# Patient Record
Sex: FEMALE | Race: WHITE | NOT HISPANIC OR LATINO | Employment: FULL TIME | URBAN - NONMETROPOLITAN AREA
[De-identification: names, ages, dates, MRNs, and addresses within clinical notes are randomized per-mention and may not be internally consistent; named-entity substitution may affect disease eponyms.]

---

## 2022-03-21 ENCOUNTER — OFFICE VISIT (OUTPATIENT)
Dept: URGENT CARE | Facility: PHYSICIAN GROUP | Age: 22
End: 2022-03-21
Payer: COMMERCIAL

## 2022-03-21 VITALS
DIASTOLIC BLOOD PRESSURE: 76 MMHG | TEMPERATURE: 97.4 F | OXYGEN SATURATION: 100 % | HEART RATE: 82 BPM | BODY MASS INDEX: 18.9 KG/M2 | HEIGHT: 71 IN | SYSTOLIC BLOOD PRESSURE: 126 MMHG | RESPIRATION RATE: 16 BRPM | WEIGHT: 135 LBS

## 2022-03-21 DIAGNOSIS — J02.0 PHARYNGITIS DUE TO STREPTOCOCCUS SPECIES: ICD-10-CM

## 2022-03-21 DIAGNOSIS — J03.90 TONSILLITIS: ICD-10-CM

## 2022-03-21 LAB
INT CON NEG: NORMAL
INT CON POS: NORMAL
S PYO AG THROAT QL: POSITIVE

## 2022-03-21 PROCEDURE — 99203 OFFICE O/P NEW LOW 30 MIN: CPT | Performed by: PHYSICIAN ASSISTANT

## 2022-03-21 PROCEDURE — 87880 STREP A ASSAY W/OPTIC: CPT | Performed by: PHYSICIAN ASSISTANT

## 2022-03-21 RX ORDER — CLINDAMYCIN HYDROCHLORIDE 300 MG/1
300 CAPSULE ORAL 3 TIMES DAILY
Qty: 30 CAPSULE | Refills: 0 | Status: SHIPPED | OUTPATIENT
Start: 2022-03-21 | End: 2022-03-31

## 2022-03-21 ASSESSMENT — ENCOUNTER SYMPTOMS
EYE PAIN: 0
DIARRHEA: 0
PALPITATIONS: 0
VOMITING: 0
MYALGIAS: 0
ABDOMINAL PAIN: 0
COUGH: 0
SINUS PAIN: 0
HEADACHES: 1
FEVER: 0
CHILLS: 0
BLURRED VISION: 0
SWOLLEN GLANDS: 1
SHORTNESS OF BREATH: 0
SORE THROAT: 1
DIZZINESS: 0
NAUSEA: 0

## 2022-03-21 NOTE — PROGRESS NOTES
Subjective     Virginia Mueller is a 21 y.o. female who presents with Pharyngitis (Moved from [place] and is having swelling in jaw, neck and throat. Keeps happening each month for the past 4 months, this time is the worse and tender to touch.)    Pharyngitis   This is a new problem. The current episode started yesterday. The problem has been unchanged. The pain is worse on the left side. There has been no fever. The pain is moderate. Associated symptoms include congestion, headaches, a plugged ear sensation and swollen glands. Pertinent negatives include no abdominal pain, coughing, diarrhea, ear pain, shortness of breath or vomiting. She has had no exposure to strep or mono. She has tried nothing (Patient reports that she has been having these recurrent symptoms almost every month for the past 4 months or so.  She has never been evaluated or treated before, however.) for the symptoms.       Review of Systems   Constitutional: Negative for chills, fever and malaise/fatigue.   HENT: Positive for congestion and sore throat. Negative for ear pain and sinus pain.    Eyes: Negative for blurred vision and pain.   Respiratory: Negative for cough and shortness of breath.    Cardiovascular: Negative for chest pain and palpitations.   Gastrointestinal: Negative for abdominal pain, diarrhea, nausea and vomiting.   Musculoskeletal: Negative for myalgias.   Skin: Negative for rash.   Neurological: Positive for headaches. Negative for dizziness.         PMH:  has no past medical history on file.  MEDS:   Current Outpatient Medications:   •  clindamycin (CLEOCIN) 300 MG Cap, Take 1 Capsule by mouth 3 times a day for 10 days., Disp: 30 Capsule, Rfl: 0  ALLERGIES:   Allergies   Allergen Reactions   • Penicillins      Full body rash     SURGHX: History reviewed. No pertinent surgical history.  SOCHX:  reports that she has never smoked. She has never used smokeless tobacco. She reports previous drug use. She reports that she does  "not drink alcohol.  FH: Family history was reviewed, no pertinent findings to report      Objective     /76   Pulse 82   Temp 36.3 °C (97.4 °F) (Temporal)   Resp 16   Ht 1.803 m (5' 11\")   Wt 61.2 kg (135 lb)   SpO2 100%   BMI 18.83 kg/m²      Physical Exam  Constitutional:       Appearance: She is well-developed.   HENT:      Head: Normocephalic and atraumatic.      Right Ear: Tympanic membrane, ear canal and external ear normal.      Left Ear: Tympanic membrane, ear canal and external ear normal.      Nose: Mucosal edema, congestion and rhinorrhea present. Rhinorrhea is clear.      Mouth/Throat:      Lips: Pink.      Mouth: Mucous membranes are moist.      Pharynx: Uvula midline. Posterior oropharyngeal erythema present. No oropharyngeal exudate or uvula swelling.      Tonsils: Tonsillar exudate present. No tonsillar abscesses. 1+ on the right. 2+ on the left.   Eyes:      Conjunctiva/sclera: Conjunctivae normal.      Pupils: Pupils are equal, round, and reactive to light.   Cardiovascular:      Rate and Rhythm: Normal rate and regular rhythm.      Heart sounds: Normal heart sounds. No murmur heard.  Pulmonary:      Effort: Pulmonary effort is normal.      Breath sounds: Normal breath sounds. No wheezing.   Musculoskeletal:      Cervical back: Normal range of motion.   Lymphadenopathy:      Cervical: Cervical adenopathy present.   Skin:     General: Skin is warm and dry.      Capillary Refill: Capillary refill takes less than 2 seconds.   Neurological:      Mental Status: She is alert and oriented to person, place, and time.   Psychiatric:         Behavior: Behavior normal.         Judgment: Judgment normal.           POCT Rapid Strep A - POSITIVE    Assessment & Plan     1. Pharyngitis due to Streptococcus species  - POCT Rapid Strep A  - clindamycin (CLEOCIN) 300 MG Cap; Take 1 Capsule by mouth 3 times a day for 10 days.  Dispense: 30 Capsule; Refill: 0  -Supportive care discussed to include salt " water gargles, throat lozenges, and increased fluid intake  - Rest  - Tylenol or ibuprofen as needed for fever > 100.4 F  Patient was advised that she is contagious until she has been on the antibiotics for a full 24 hours.  Also recommend that she switch her toothbrush out after being on the antibiotics for 2 to 3 days.    2. Tonsillitis  - POCT Rapid Strep A  - clindamycin (CLEOCIN) 300 MG Cap; Take 1 Capsule by mouth 3 times a day for 10 days.  Dispense: 30 Capsule; Refill: 0            Differential Diagnosis, natural history, and supportive care discussed. Return to the Urgent Care or follow up with your PCP if symptoms fail to resolve, or for any new or worsening symptoms. Emergency room precautions discussed. Patient and/or family appears understanding of information.

## 2022-03-23 ENCOUNTER — APPOINTMENT (OUTPATIENT)
Dept: RADIOLOGY | Facility: IMAGING CENTER | Age: 22
End: 2022-03-23
Attending: CHIROPRACTOR
Payer: COMMERCIAL

## 2022-03-23 ENCOUNTER — APPOINTMENT (OUTPATIENT)
Dept: URGENT CARE | Facility: PHYSICIAN GROUP | Age: 22
End: 2022-03-23
Payer: COMMERCIAL

## 2022-03-23 DIAGNOSIS — M54.6 PAIN IN THORACIC SPINE: ICD-10-CM

## 2022-03-23 PROCEDURE — 72072 X-RAY EXAM THORAC SPINE 3VWS: CPT | Mod: TC,FY | Performed by: PHYSICIAN ASSISTANT

## 2022-05-14 ENCOUNTER — TELEPHONE (OUTPATIENT)
Dept: SCHEDULING | Facility: IMAGING CENTER | Age: 22
End: 2022-05-14

## 2022-05-17 ENCOUNTER — OFFICE VISIT (OUTPATIENT)
Dept: MEDICAL GROUP | Facility: PHYSICIAN GROUP | Age: 22
End: 2022-05-17
Payer: COMMERCIAL

## 2022-05-17 ENCOUNTER — HOSPITAL ENCOUNTER (OUTPATIENT)
Dept: LAB | Facility: MEDICAL CENTER | Age: 22
End: 2022-05-17
Attending: NURSE PRACTITIONER
Payer: COMMERCIAL

## 2022-05-17 VITALS
WEIGHT: 132 LBS | RESPIRATION RATE: 16 BRPM | HEART RATE: 82 BPM | HEIGHT: 71 IN | DIASTOLIC BLOOD PRESSURE: 70 MMHG | SYSTOLIC BLOOD PRESSURE: 112 MMHG | TEMPERATURE: 98.5 F | BODY MASS INDEX: 18.48 KG/M2 | OXYGEN SATURATION: 98 %

## 2022-05-17 DIAGNOSIS — Z13.21 ENCOUNTER FOR VITAMIN DEFICIENCY SCREENING: ICD-10-CM

## 2022-05-17 DIAGNOSIS — Z13.29 SCREENING FOR THYROID DISORDER: ICD-10-CM

## 2022-05-17 DIAGNOSIS — R55 PRE-SYNCOPE: ICD-10-CM

## 2022-05-17 DIAGNOSIS — Z13.6 SCREENING FOR CARDIOVASCULAR CONDITION: ICD-10-CM

## 2022-05-17 DIAGNOSIS — K59.00 CONSTIPATION, UNSPECIFIED CONSTIPATION TYPE: ICD-10-CM

## 2022-05-17 LAB
ALBUMIN SERPL BCP-MCNC: 4.8 G/DL (ref 3.2–4.9)
ALBUMIN/GLOB SERPL: 1.9 G/DL
ALP SERPL-CCNC: 68 U/L (ref 30–99)
ALT SERPL-CCNC: 14 U/L (ref 2–50)
ANION GAP SERPL CALC-SCNC: 11 MMOL/L (ref 7–16)
AST SERPL-CCNC: 17 U/L (ref 12–45)
BASOPHILS # BLD AUTO: 0.3 % (ref 0–1.8)
BASOPHILS # BLD: 0.02 K/UL (ref 0–0.12)
BILIRUB SERPL-MCNC: 0.7 MG/DL (ref 0.1–1.5)
BUN SERPL-MCNC: 10 MG/DL (ref 8–22)
CALCIUM SERPL-MCNC: 9.4 MG/DL (ref 8.5–10.5)
CHLORIDE SERPL-SCNC: 103 MMOL/L (ref 96–112)
CO2 SERPL-SCNC: 22 MMOL/L (ref 20–33)
CREAT SERPL-MCNC: 0.76 MG/DL (ref 0.5–1.4)
EOSINOPHIL # BLD AUTO: 0.03 K/UL (ref 0–0.51)
EOSINOPHIL NFR BLD: 0.5 % (ref 0–6.9)
ERYTHROCYTE [DISTWIDTH] IN BLOOD BY AUTOMATED COUNT: 42 FL (ref 35.9–50)
FASTING STATUS PATIENT QL REPORTED: NORMAL
FERRITIN SERPL-MCNC: 16.8 NG/ML (ref 10–291)
GFR SERPLBLD CREATININE-BSD FMLA CKD-EPI: 115 ML/MIN/1.73 M 2
GLOBULIN SER CALC-MCNC: 2.5 G/DL (ref 1.9–3.5)
GLUCOSE SERPL-MCNC: 82 MG/DL (ref 65–99)
HCT VFR BLD AUTO: 43.9 % (ref 37–47)
HGB BLD-MCNC: 15 G/DL (ref 12–16)
IMM GRANULOCYTES # BLD AUTO: 0.02 K/UL (ref 0–0.11)
IMM GRANULOCYTES NFR BLD AUTO: 0.3 % (ref 0–0.9)
IRON SATN MFR SERPL: 32 % (ref 15–55)
IRON SERPL-MCNC: 109 UG/DL (ref 40–170)
LYMPHOCYTES # BLD AUTO: 1.46 K/UL (ref 1–4.8)
LYMPHOCYTES NFR BLD: 25.3 % (ref 22–41)
MCH RBC QN AUTO: 29.7 PG (ref 27–33)
MCHC RBC AUTO-ENTMCNC: 34.2 G/DL (ref 33.6–35)
MCV RBC AUTO: 86.9 FL (ref 81.4–97.8)
MONOCYTES # BLD AUTO: 0.44 K/UL (ref 0–0.85)
MONOCYTES NFR BLD AUTO: 7.6 % (ref 0–13.4)
NEUTROPHILS # BLD AUTO: 3.79 K/UL (ref 2–7.15)
NEUTROPHILS NFR BLD: 66 % (ref 44–72)
NRBC # BLD AUTO: 0 K/UL
NRBC BLD-RTO: 0 /100 WBC
PLATELET # BLD AUTO: 238 K/UL (ref 164–446)
PMV BLD AUTO: 11.3 FL (ref 9–12.9)
POTASSIUM SERPL-SCNC: 3.8 MMOL/L (ref 3.6–5.5)
PROT SERPL-MCNC: 7.3 G/DL (ref 6–8.2)
RBC # BLD AUTO: 5.05 M/UL (ref 4.2–5.4)
SODIUM SERPL-SCNC: 136 MMOL/L (ref 135–145)
TIBC SERPL-MCNC: 336 UG/DL (ref 250–450)
TSH SERPL DL<=0.005 MIU/L-ACNC: 0.9 UIU/ML (ref 0.38–5.33)
UIBC SERPL-MCNC: 227 UG/DL (ref 110–370)
VIT B12 SERPL-MCNC: 549 PG/ML (ref 211–911)
WBC # BLD AUTO: 5.8 K/UL (ref 4.8–10.8)

## 2022-05-17 PROCEDURE — 83550 IRON BINDING TEST: CPT

## 2022-05-17 PROCEDURE — 84443 ASSAY THYROID STIM HORMONE: CPT

## 2022-05-17 PROCEDURE — 80053 COMPREHEN METABOLIC PANEL: CPT

## 2022-05-17 PROCEDURE — 82607 VITAMIN B-12: CPT

## 2022-05-17 PROCEDURE — 82306 VITAMIN D 25 HYDROXY: CPT

## 2022-05-17 PROCEDURE — 36415 COLL VENOUS BLD VENIPUNCTURE: CPT

## 2022-05-17 PROCEDURE — 83540 ASSAY OF IRON: CPT

## 2022-05-17 PROCEDURE — 99214 OFFICE O/P EST MOD 30 MIN: CPT | Performed by: NURSE PRACTITIONER

## 2022-05-17 PROCEDURE — 85025 COMPLETE CBC W/AUTO DIFF WBC: CPT

## 2022-05-17 PROCEDURE — 82728 ASSAY OF FERRITIN: CPT

## 2022-05-17 ASSESSMENT — PATIENT HEALTH QUESTIONNAIRE - PHQ9: CLINICAL INTERPRETATION OF PHQ2 SCORE: 0

## 2022-05-17 NOTE — PROGRESS NOTES
CC: Establish care, symptoms when exerted    HISTORY OF THE PRESENT ILLNESS: Patient is a 21 y.o. female. This pleasant patient is here today to establish care and for evaluation and management of the following health problems.    Patient moved to the area about 6 months ago.  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      Pre-syncope  Patient is 21-year-old female here to establish care with me today.  During sex or hot showers or vigorous exercise ears start ringing, gets tingling sensation all over, nauseous, and starts to blackout.  She will immediately stop what she is doing and lying down.  Takes about 5 minutes to resolve.  Most recent episode had new symptom of lip muscles demetris and she had no control.  Symptoms have been worsening in the last 6 months.  However, has had symptoms for more than a couple years.  Would often occur when she was participating in track.  Also reports that she is always had trouble regulating her body temperature.  Will be too hot or too cold.  History of eating disorder last year, now resolved.  Though does admit to eating only once a day sometimes.  Drinks approximately 36 ounces of water a day.  She wonders if she has anemia.  Reports menses are irregular, and sometimes light sometimes heavy.  Eats meat.  Does report some constipation.  Has never fully passed out.  Has never seen a provider regarding her symptoms.      Allergies: Amoxicillin and Penicillins    No current [facility]-ordered outpatient medications on file.     No current [facility]-ordered facility-administered medications on file.       History reviewed. No pertinent past medical history.    Past Surgical History:   Procedure Laterality Date   • SEPTOPLASTY N/A        Social History     Tobacco Use   • Smoking status: Never Smoker   • Smokeless tobacco: Never Used   Vaping Use   • Vaping Use: Some days   • Substances: Nicotine   Substance Use Topics   • Alcohol use: Yes     Comment: once in awhile   • Drug  "use: Never       Family History   Problem Relation Age of Onset   • Other Mother         ovarian cyst   • Diabetes Father    • Anxiety disorder Brother        ROS:     - Constitutional: Negative for fever, chills, unexpected weight change, and fatigue/generalized weakness.     - HEENT: Negative for headaches, vision changes, hearing changes, ear pain, rhinorrhea, sinus congestion, and sore throat.      - Respiratory: Negative for cough, dyspnea, and wheezing.      - Cardiovascular: Negative for chest pain, palpitations, orthopnea, and bilateral lower extremity edema.     - Gastrointestinal: As in HPI, otherwise negative for heartburn, vomiting, abdominal pain, hematochezia, melena, diarrhea.  Positive constipation.     - Genitourinary: Negative for dysuria, polyuria, hematuria, pyuria, urinary urgency, and urinary incontinence.    - Musculoskeletal: Negative for myalgias, and joint pain.  Positive back pain.    - Skin: Negative for rash, itching, cyanotic skin color change.     - Neurological: As in HPI, otherwise negative for tremors, focal sensory deficit, focal weakness and headaches.     - Endo/Heme/Allergies: Does not bruise/bleed easily.     - Psychiatric/Behavioral: Negative for depression, suicidal/homicidal ideation and memory loss.           Exam: /70 (BP Location: Right arm, Patient Position: Sitting, BP Cuff Size: Adult)   Pulse 82   Temp 36.9 °C (98.5 °F) (Temporal)   Resp 16   Ht 1.803 m (5' 11\")   Wt 59.9 kg (132 lb)   SpO2 98%  Body mass index is 18.41 kg/m².    General: Alert, pleasant, well nourished, well developed female in NAD  HEENT: Normocephalic. Eyes conjunctiva clear lids without ptosis, pupils equal and reactive to light, ears normal shape and contour, canals are clear bilaterally, tympanic membranes are pearly gray with good light reflex, nasal mucosa without erythema and drainage, oropharynx is without erythema, edema or exudates.   Neck: Supple without bruit. Thyroid is not " enlarged.  Pulmonary: Clear to ausculation.  Normal effort. No rales, ronchi, or wheezing.  Cardiovascular: Normal rate and rhythm without murmur. Carotid and radial pulses are intact and equal bilaterally.  No lower extremity edema.  Abdomen: Soft, nontender, nondistended. Normal bowel sounds. Liver and spleen are not palpable  Neurologic: Grossly nonfocal  Lymph: No cervical or supraclavicular lymph nodes are palpable  Skin: Warm and dry.    Musculoskeletal: Normal gait.   Psych: Normal mood and affect. Alert and oriented. Judgment and insight is normal.    Please note that this dictation was created using voice recognition software. I have made every reasonable attempt to correct obvious errors, but I expect that there are errors of grammar and possibly content that I did not discover before finalizing the note.      Assessment/Plan  1. Pre-syncope  Differentials include hypotension, vasovagal response, dehydration, low blood sugar, hypothyroid, anemia, cardiac etiology.  We will get lab work done.  Treatment plan pending results.  If all normal, consider referral to cardiology.  Patient verbalizes understanding.  In the meantime, advised patient to keep a journal of what she is eating and drinking to see if there is a correlation with symptoms.  - Comp Metabolic Panel; Future  - ESTIMATED GFR; Future  - VITAMIN B12; Future  - CBC WITH DIFFERENTIAL; Future  - FERRITIN; Future  - IRON/TOTAL IRON BIND; Future    2. Screening for cardiovascular condition    - Comp Metabolic Panel; Future  - ESTIMATED GFR; Future  - CBC WITH DIFFERENTIAL; Future    3. Encounter for vitamin deficiency screening    - VITAMIN D,25 HYDROXY; Future    4. Screening for thyroid disorder    - TSH WITH REFLEX TO FT4; Future    5. Constipation, unspecified constipation type  In regards to constipation, advised on high-fiber diet and handout given to patient today.

## 2022-05-17 NOTE — ASSESSMENT & PLAN NOTE
Patient is 21-year-old female here to establish care with me today.  During sex or hot showers or vigorous exercise ears start ringing, gets tingling sensation all over, nauseous, and starts to blackout.  She will immediately stop what she is doing and lying down.  Takes about 5 minutes to resolve.  Most recent episode had new symptom of lip muscles demetris and she had no control.  Symptoms have been worsening in the last 6 months.  However, has had symptoms for more than a couple years.  Would often occur when she was participating in track.  Also reports that she is always had trouble regulating her body temperature.  Will be too hot or too cold.  History of eating disorder last year, now resolved.  Though does admit to eating only once a day sometimes.  Drinks approximately 36 ounces of water a day.  She wonders if she has anemia.  Reports menses are irregular, and sometimes light sometimes heavy.  Eats meat.  Does report some constipation.  Has never fully passed out.  Has never seen a provider regarding her symptoms.

## 2022-05-21 LAB — 25(OH)D3 SERPL-MCNC: 26 NG/ML (ref 30–80)

## 2022-05-26 ENCOUNTER — PATIENT MESSAGE (OUTPATIENT)
Dept: MEDICAL GROUP | Facility: PHYSICIAN GROUP | Age: 22
End: 2022-05-26
Payer: COMMERCIAL

## 2022-05-26 DIAGNOSIS — R55 PRE-SYNCOPE: ICD-10-CM

## 2022-06-22 ENCOUNTER — OFFICE VISIT (OUTPATIENT)
Dept: URGENT CARE | Facility: PHYSICIAN GROUP | Age: 22
End: 2022-06-22
Payer: COMMERCIAL

## 2022-06-22 VITALS
HEART RATE: 67 BPM | SYSTOLIC BLOOD PRESSURE: 100 MMHG | HEIGHT: 71 IN | WEIGHT: 136 LBS | RESPIRATION RATE: 14 BRPM | OXYGEN SATURATION: 99 % | BODY MASS INDEX: 19.04 KG/M2 | TEMPERATURE: 98.3 F | DIASTOLIC BLOOD PRESSURE: 60 MMHG

## 2022-06-22 DIAGNOSIS — J06.9 VIRAL UPPER RESPIRATORY TRACT INFECTION: ICD-10-CM

## 2022-06-22 DIAGNOSIS — R51.9 ACUTE NONINTRACTABLE HEADACHE, UNSPECIFIED HEADACHE TYPE: ICD-10-CM

## 2022-06-22 LAB
EXTERNAL QUALITY CONTROL: NORMAL
SARS-COV+SARS-COV-2 AG RESP QL IA.RAPID: NEGATIVE

## 2022-06-22 PROCEDURE — 99213 OFFICE O/P EST LOW 20 MIN: CPT | Performed by: NURSE PRACTITIONER

## 2022-06-22 PROCEDURE — 87426 SARSCOV CORONAVIRUS AG IA: CPT | Performed by: NURSE PRACTITIONER

## 2022-06-22 ASSESSMENT — ENCOUNTER SYMPTOMS
HEADACHES: 1
HEMOPTYSIS: 0
WHEEZING: 0
FEVER: 0
SPUTUM PRODUCTION: 0
SORE THROAT: 1
SHORTNESS OF BREATH: 0
COUGH: 1
SINUS PAIN: 0
DIZZINESS: 0
DIAPHORESIS: 0
CHILLS: 0

## 2022-06-22 ASSESSMENT — FIBROSIS 4 INDEX: FIB4 SCORE: 0.36

## 2022-06-22 NOTE — LETTER
June 22, 2022         Patient: Virginia Mueller   YOB: 2000   Date of Visit: 6/22/2022           To Whom it May Concern:    Virginia Mueller was seen in my clinic on 6/22/2022. She may return to work in 1-3 days when symptoms improve. Please excuse her absence due to acute illness.    If you have any questions or concerns, please don't hesitate to call.        Sincerely,           MAGALIS Ortiz.  Electronically Signed

## 2022-06-22 NOTE — PROGRESS NOTES
"Subjective     Virginia Mueller is a 21 y.o. female who presents with Cough (Started yesterday), Headache, and Muscle Pain (In her neck)            Virginia comes in today with a 2 day history of fatigue, headache, rhinorrhea, mild sore throat, mild cough and myalgia.  Denies any fever, chills, chest pain, shortness of breath or neck stiffness.  Not taking any meds to treat the symptoms.  Not vaccinated against covid.  She thinks she had covid 2 years ago but was never tested.          Review of Systems   Constitutional: Positive for malaise/fatigue. Negative for chills, diaphoresis and fever.   HENT: Positive for congestion and sore throat. Negative for ear pain and sinus pain.    Respiratory: Positive for cough. Negative for hemoptysis, sputum production, shortness of breath and wheezing.    Cardiovascular: Negative for chest pain.   Neurological: Positive for headaches. Negative for dizziness.     Medications, Allergies, and current problem list reviewed today in Epic         Objective     Blood Pressure 100/60   Pulse 67   Temperature 36.8 °C (98.3 °F) (Temporal)   Respiration 14   Height 1.803 m (5' 11\")   Weight 61.7 kg (136 lb)   Oxygen Saturation 99%   Body Mass Index 18.97 kg/m²      Physical Exam  Vitals reviewed.   Constitutional:       General: She is not in acute distress.     Appearance: Normal appearance. She is well-developed. She is not ill-appearing, toxic-appearing or diaphoretic.   HENT:      Head: Normocephalic.      Right Ear: Tympanic membrane, ear canal and external ear normal. There is no impacted cerumen.      Left Ear: Tympanic membrane, ear canal and external ear normal. There is no impacted cerumen.      Nose: Rhinorrhea present. No congestion.      Mouth/Throat:      Mouth: Mucous membranes are moist.      Pharynx: Posterior oropharyngeal erythema present. No oropharyngeal exudate.   Eyes:      General: No scleral icterus.        Right eye: No discharge.         Left eye: No " discharge.      Conjunctiva/sclera: Conjunctivae normal.   Neck:      Thyroid: No thyromegaly.      Vascular: No JVD.      Trachea: No tracheal deviation.   Cardiovascular:      Rate and Rhythm: Normal rate and regular rhythm.      Heart sounds: Normal heart sounds. No murmur heard.    No friction rub. No gallop.   Pulmonary:      Effort: Pulmonary effort is normal. No respiratory distress.      Breath sounds: Normal breath sounds. No stridor. No wheezing, rhonchi or rales.   Chest:      Chest wall: No tenderness.   Musculoskeletal:      Cervical back: Neck supple.   Lymphadenopathy:      Cervical: No cervical adenopathy.   Skin:     General: Skin is warm and dry.      Coloration: Skin is not jaundiced or pale.   Neurological:      Mental Status: She is alert and oriented to person, place, and time.                POCT covid antigen: negative             Assessment & Plan        1. Viral upper respiratory tract infection  - POCT SARS-COV Antigen GENIE (Symptomatic only)    2. Acute nonintractable headache, unspecified headache type     Advised Virginia that based on the history and exam findings, this is likely a viral illness.  There is no indication for antibiotics at this time.  Differential reviewed.  She politely declined covid PCR due to anticipated cost as a barrier.  OTC NSAIDs or tylenol prn fever, pain.  OTC cold medications prn symptom management.  Maintain adequate po hydration.  RTC in 5-7 days if symptoms persist, sooner if worse.  Patient verbalized understanding of and agreed with plan of care.

## 2022-06-29 ENCOUNTER — PATIENT MESSAGE (OUTPATIENT)
Dept: MEDICAL GROUP | Facility: PHYSICIAN GROUP | Age: 22
End: 2022-06-29
Payer: COMMERCIAL

## 2022-07-21 ENCOUNTER — TELEPHONE (OUTPATIENT)
Dept: CARDIOLOGY | Facility: MEDICAL CENTER | Age: 22
End: 2022-07-21
Payer: COMMERCIAL

## 2022-07-21 NOTE — TELEPHONE ENCOUNTER
Spoke to pt in regards to obtaining records for NP appointment with [facility]. Per patient has never been treated by a previous cardiologist. Confirmed that all recent notes and labs are in Epic. Pt stated she has had no cardiac imaging or testing done. Confirmed with patient appointment date, time, and location.

## 2022-08-01 ENCOUNTER — OFFICE VISIT (OUTPATIENT)
Dept: CARDIOLOGY | Facility: PHYSICIAN GROUP | Age: 22
End: 2022-08-01
Attending: NURSE PRACTITIONER
Payer: COMMERCIAL

## 2022-08-01 VITALS
RESPIRATION RATE: 12 BRPM | OXYGEN SATURATION: 98 % | WEIGHT: 136 LBS | BODY MASS INDEX: 19.04 KG/M2 | SYSTOLIC BLOOD PRESSURE: 104 MMHG | HEIGHT: 71 IN | HEART RATE: 60 BPM | DIASTOLIC BLOOD PRESSURE: 60 MMHG

## 2022-08-01 DIAGNOSIS — R55 PRE-SYNCOPE: ICD-10-CM

## 2022-08-01 PROCEDURE — 99203 OFFICE O/P NEW LOW 30 MIN: CPT | Performed by: INTERNAL MEDICINE

## 2022-08-01 ASSESSMENT — ENCOUNTER SYMPTOMS
COUGH: 0
WEIGHT GAIN: 0
SYNCOPE: 0
PALPITATIONS: 0
ABDOMINAL PAIN: 0
DIZZINESS: 0
CONSTIPATION: 0
SHORTNESS OF BREATH: 0
VOMITING: 0
DEPRESSION: 0
CLAUDICATION: 0
BACK PAIN: 0
WEIGHT LOSS: 0
DIARRHEA: 0
NEAR-SYNCOPE: 0
ALTERED MENTAL STATUS: 0
IRREGULAR HEARTBEAT: 0
DYSPNEA ON EXERTION: 0
PND: 0
BLURRED VISION: 0
FEVER: 0
DECREASED APPETITE: 0
HEARTBURN: 0
ORTHOPNEA: 0
FLANK PAIN: 0
NAUSEA: 0

## 2022-08-01 ASSESSMENT — FIBROSIS 4 INDEX: FIB4 SCORE: 0.36

## 2022-08-01 NOTE — PATIENT INSTRUCTIONS
Increase fluid intake and more regular meals.   Exercise especially leg toning.   Quit cessation of vaping.

## 2022-08-01 NOTE — PROGRESS NOTES
"Cardiology Note    Chief Complaint   Patient presents with   • Syncope     NP Dx: Pre-Syncope        History of Present Illness: Virginia Mueller is a 21 y.o. female who presents for initial visit. Referred by Dr Brand for presyncope.    About a year ago presyncope. Mostly during running on hot days. Now also noticed in hot shower. Also noticed during sexual activity. Tingling in arms and face and nausea as well. Felt lip numbness when occurs. No other cardiac complaints. Doesn't appear has ever had syncope but has had to lower herself to floor to keep from losing consciousness. She started vaping at parties but now having a hard time weaning off. Drinks about 1-2 beers per night and occasional binging. Denies illicit drugs. Mother with heart condition NOS; called her and described history of tachycardia. Patient admits doesn't eat regular meals. She suspects has \"eating disorder\" over past year. She is also an avid athlete and used to work out regularly. Works for [employer]. Moved from [place].      Review of Systems   Constitutional: Negative for decreased appetite, fever, malaise/fatigue, weight gain and weight loss.   HENT: Negative for congestion and nosebleeds.    Eyes: Negative for blurred vision.   Cardiovascular: Negative for chest pain, claudication, dyspnea on exertion, irregular heartbeat, leg swelling, near-syncope, orthopnea, palpitations, paroxysmal nocturnal dyspnea and syncope.   Respiratory: Negative for cough and shortness of breath.    Endocrine: Negative for cold intolerance and heat intolerance.   Skin: Negative for rash.   Musculoskeletal: Negative for back pain.   Gastrointestinal: Negative for abdominal pain, constipation, diarrhea, heartburn, melena, nausea and vomiting.   Genitourinary: Negative for dysuria, flank pain and hematuria.   Neurological: Negative for dizziness.   Psychiatric/Behavioral: Negative for altered mental status and depression.         History reviewed. No " "pertinent past medical history.      Past Surgical History:   Procedure Laterality Date   • SEPTOPLASTY N/A          No current outpatient medications on file.     No current facility-administered medications for this visit.         Allergies   Allergen Reactions   • Amoxicillin      Other reaction(s): Hives   • Penicillins      Full body rash         Family History   Problem Relation Age of Onset   • Other Mother         ovarian cyst   • Diabetes Father    • Anxiety disorder Brother          Social History     Socioeconomic History   • Marital status: Single     Spouse name: Not on file   • Number of children: Not on file   • Years of education: Not on file   • Highest education level: Not on file   Occupational History   • Not on file   Tobacco Use   • Smoking status: Never Smoker   • Smokeless tobacco: Never Used   Vaping Use   • Vaping Use: Every day   • Substances: Nicotine   Substance and Sexual Activity   • Alcohol use: Yes     Comment: occ   • Drug use: Never   • Sexual activity: Not Currently     Partners: Male     Birth control/protection: Condom   Other Topics Concern   • Not on file   Social History Narrative   • Not on file     Social Determinants of Health     Financial Resource Strain: Not on file   Food Insecurity: Not on file   Transportation Needs: Not on file   Physical Activity: Not on file   Stress: Not on file   Social Connections: Not on file   Intimate Partner Violence: Not on file   Housing Stability: Not on file         Physical Exam:  Ambulatory Vitals  /60 (BP Location: Right arm, Patient Position: Standing, BP Cuff Size: Adult)   Pulse 60   Resp 12   Ht 1.803 m (5' 11\")   Wt 61.7 kg (136 lb)   SpO2 98%    BP Readings from Last 4 Encounters:   08/01/22 104/60   06/22/22 100/60   05/17/22 112/70   03/21/22 126/76     Weight/BMI:   Vitals:    08/01/22 1350 08/01/22 1424 08/01/22 1426 08/01/22 1427   BP: 102/58 100/58 110/64 104/60   Weight: 61.7 kg (136 lb)      Height: 1.803 m " "(5' 11\")       Body mass index is 18.97 kg/m².  Wt Readings from Last 4 Encounters:   08/01/22 61.7 kg (136 lb) (64 %, Z= 0.36)*   06/22/22 61.7 kg (136 lb) (64 %, Z= 0.37)*   05/17/22 59.9 kg (132 lb) (58 %, Z= 0.21)*   03/21/22 61.2 kg (135 lb) (64 %, Z= 0.36)*     * Growth percentiles are based on CDC (Girls, 2-20 Years) data.       Physical Exam  Constitutional:       General: She is not in acute distress.  HENT:      Head: Normocephalic and atraumatic.   Eyes:      Conjunctiva/sclera: Conjunctivae normal.      Pupils: Pupils are equal, round, and reactive to light.   Neck:      Vascular: No JVD.   Cardiovascular:      Rate and Rhythm: Normal rate and regular rhythm.      Heart sounds: Normal heart sounds. No murmur heard.    No friction rub. No gallop.   Pulmonary:      Effort: Pulmonary effort is normal. No respiratory distress.      Breath sounds: Normal breath sounds. No wheezing or rales.   Chest:      Chest wall: No tenderness.   Abdominal:      General: Bowel sounds are normal. There is no distension.      Palpations: Abdomen is soft.   Musculoskeletal:      Cervical back: Normal range of motion and neck supple.   Skin:     General: Skin is warm and dry.   Neurological:      Mental Status: She is alert and oriented to person, place, and time.   Psychiatric:         Mood and Affect: Affect normal.         Judgment: Judgment normal.         Lab Data Review:  No results found for: CHOLSTRLTOT, LDL, HDL, TRIGLYCERIDE    Lab Results   Component Value Date/Time    SODIUM 136 05/17/2022 12:16 PM    POTASSIUM 3.8 05/17/2022 12:16 PM    CHLORIDE 103 05/17/2022 12:16 PM    CO2 22 05/17/2022 12:16 PM    GLUCOSE 82 05/17/2022 12:16 PM    BUN 10 05/17/2022 12:16 PM    CREATININE 0.76 05/17/2022 12:16 PM     CrCl cannot be calculated (Patient's most recent lab result is older than the maximum 7 days allowed.).  Lab Results   Component Value Date/Time    ALKPHOSPHAT 68 05/17/2022 12:16 PM    ASTSGOT 17 05/17/2022 12:16 " PM    ALTSGPT 14 05/17/2022 12:16 PM    TBILIRUBIN 0.7 05/17/2022 12:16 PM      Lab Results   Component Value Date/Time    WBC 5.8 05/17/2022 12:16 PM     No results found for: HBA1C  No components found for: TROP      Cardiac Imaging and Procedures Review:      EKG 8/1/22 interpreted by me normal sinus  Orthostatics in office negative    Medical Decision Making:  Problem List Items Addressed This Visit     Pre-syncope    Relevant Orders    EKG        Most suggestive of orthostatic or vasovagal symptoms. Please increase fluid intake and consume regular meals. Liberalize salt intake. Resume exercise especially for leg toning/strenthening. Vaping cessation and avoidance alcohol binging.     It was my pleasure to meet with Ms. Mueller.

## 2022-10-25 ENCOUNTER — HOSPITAL ENCOUNTER (OUTPATIENT)
Facility: MEDICAL CENTER | Age: 22
End: 2022-10-25
Attending: STUDENT IN AN ORGANIZED HEALTH CARE EDUCATION/TRAINING PROGRAM
Payer: COMMERCIAL

## 2022-10-25 ENCOUNTER — OFFICE VISIT (OUTPATIENT)
Dept: URGENT CARE | Facility: CLINIC | Age: 22
End: 2022-10-25
Payer: COMMERCIAL

## 2022-10-25 VITALS
WEIGHT: 139 LBS | SYSTOLIC BLOOD PRESSURE: 102 MMHG | TEMPERATURE: 98.5 F | BODY MASS INDEX: 19.46 KG/M2 | RESPIRATION RATE: 14 BRPM | HEART RATE: 95 BPM | OXYGEN SATURATION: 98 % | HEIGHT: 71 IN | DIASTOLIC BLOOD PRESSURE: 64 MMHG

## 2022-10-25 DIAGNOSIS — N30.00 ACUTE CYSTITIS WITHOUT HEMATURIA: ICD-10-CM

## 2022-10-25 LAB
APPEARANCE UR: NORMAL
BILIRUB UR STRIP-MCNC: NORMAL MG/DL
COLOR UR AUTO: NORMAL
GLUCOSE UR STRIP.AUTO-MCNC: 250 MG/DL
INT CON NEG: NEGATIVE
INT CON POS: POSITIVE
KETONES UR STRIP.AUTO-MCNC: 15 MG/DL
LEUKOCYTE ESTERASE UR QL STRIP.AUTO: NORMAL
NITRITE UR QL STRIP.AUTO: POSITIVE
PH UR STRIP.AUTO: 5 [PH] (ref 5–8)
POC URINE PREGNANCY TEST: NEGATIVE
PROT UR QL STRIP: >=300 MG/DL
RBC UR QL AUTO: NORMAL
SP GR UR STRIP.AUTO: 1
UROBILINOGEN UR STRIP-MCNC: 4 MG/DL

## 2022-10-25 PROCEDURE — 99213 OFFICE O/P EST LOW 20 MIN: CPT | Performed by: STUDENT IN AN ORGANIZED HEALTH CARE EDUCATION/TRAINING PROGRAM

## 2022-10-25 PROCEDURE — 81002 URINALYSIS NONAUTO W/O SCOPE: CPT | Performed by: STUDENT IN AN ORGANIZED HEALTH CARE EDUCATION/TRAINING PROGRAM

## 2022-10-25 PROCEDURE — 87077 CULTURE AEROBIC IDENTIFY: CPT

## 2022-10-25 PROCEDURE — 87086 URINE CULTURE/COLONY COUNT: CPT

## 2022-10-25 PROCEDURE — 81025 URINE PREGNANCY TEST: CPT | Performed by: STUDENT IN AN ORGANIZED HEALTH CARE EDUCATION/TRAINING PROGRAM

## 2022-10-25 RX ORDER — NITROFURANTOIN 25; 75 MG/1; MG/1
100 CAPSULE ORAL EVERY 12 HOURS
Qty: 10 CAPSULE | Refills: 0 | Status: SHIPPED | OUTPATIENT
Start: 2022-10-25 | End: 2022-10-30

## 2022-10-25 ASSESSMENT — ENCOUNTER SYMPTOMS
VOMITING: 0
SWEATS: 0
FEVER: 0
ABDOMINAL PAIN: 0
CHILLS: 0
NAUSEA: 0
FLANK PAIN: 0

## 2022-10-25 ASSESSMENT — FIBROSIS 4 INDEX: FIB4 SCORE: 0.36

## 2022-10-26 DIAGNOSIS — N30.00 ACUTE CYSTITIS WITHOUT HEMATURIA: ICD-10-CM

## 2022-10-26 NOTE — PROGRESS NOTES
"Subjective     Virginia Mueller is a 21 y.o. female who presents with UTI (X 2 days pain with urination, urgency and frequency. )            UTI  This is a new problem. The current episode started yesterday. The problem has been unchanged. Associated symptoms include urinary symptoms. Pertinent negatives include no abdominal pain, chills, fever, nausea or vomiting.   Dysuria   This is a new problem. The current episode started yesterday. The problem has been unchanged. The quality of the pain is described as burning. The pain is mild. Associated symptoms include frequency and urgency. Pertinent negatives include no chills, discharge, flank pain, hematuria, hesitancy, nausea, possible pregnancy, sweats or vomiting. Treatments tried: AZO.     Review of Systems   Constitutional:  Negative for chills, fever and malaise/fatigue.   Gastrointestinal:  Negative for abdominal pain, nausea and vomiting.   Genitourinary:  Positive for dysuria, frequency and urgency. Negative for flank pain, hematuria and hesitancy.   All other systems reviewed and are negative.           Objective     /64   Pulse 95   Temp 36.9 °C (98.5 °F) (Temporal)   Resp 14   Ht 1.803 m (5' 11\")   Wt 63 kg (139 lb)   LMP 09/27/2022   SpO2 98%   BMI 19.39 kg/m²      Physical Exam  Vitals reviewed.   Constitutional:       Appearance: Normal appearance.   HENT:      Head: Normocephalic and atraumatic.   Cardiovascular:      Rate and Rhythm: Normal rate.   Pulmonary:      Effort: Pulmonary effort is normal.   Abdominal:      General: Abdomen is flat. Bowel sounds are normal. There is no distension.      Palpations: Abdomen is soft.      Tenderness: There is no abdominal tenderness. There is no right CVA tenderness, left CVA tenderness, guarding or rebound.   Skin:     General: Skin is warm and dry.   Neurological:      General: No focal deficit present.      Mental Status: She is alert. Mental status is at baseline.                     "       Assessment & Plan        1. Acute cystitis without hematuria  - POCT Urinalysis  - POCT Pregnancy  - URINE CULTURE(NEW); Future  - nitrofurantoin (MACROBID) 100 MG Cap; Take 1 Capsule by mouth every 12 hours for 5 days.  Dispense: 10 Capsule; Refill: 0     Patient did take AZO prior to urgent care visit. Patinet to be treated for UTI given patients symptoms. Urine culture collected and sent to lab. Patient notified of any positive results. Results available via [employer]. Supportive care includes: increase fluids and OTC NSAIDs. F/U with PCP.    I personally reviewed prior external notes and test results pertinent to today's visit.    Differential diagnoses, supportive care, and indications for immediate follow-up discussed with patient. Pathogenesis of diagnosis discussed including typical length and natural progression.      Instructed to return to urgent care or nearest emergency department if symptoms fail to improve, for any change in condition, further concerns, or new concerning symptoms.    Patient states understanding and agrees with the plan of care and discharge instructions.

## 2022-10-28 LAB
BACTERIA UR CULT: ABNORMAL
BACTERIA UR CULT: ABNORMAL
SIGNIFICANT IND 70042: ABNORMAL
SITE SITE: ABNORMAL
SOURCE SOURCE: ABNORMAL

## 2022-10-30 ENCOUNTER — TELEPHONE (OUTPATIENT)
Dept: URGENT CARE | Facility: PHYSICIAN GROUP | Age: 22
End: 2022-10-30

## 2022-10-30 DIAGNOSIS — N30.01 ACUTE CYSTITIS WITH HEMATURIA: ICD-10-CM

## 2023-01-12 NOTE — TELEPHONE ENCOUNTER
Urine Culture positive for Streptococcus agalactiae.    Instructed to return to urgent care or nearest emergency department if symptoms fail to improve, for any change in condition, further concerns, or new concerning symptoms.

## 2023-06-28 ENCOUNTER — OFFICE VISIT (OUTPATIENT)
Dept: URGENT CARE | Facility: PHYSICIAN GROUP | Age: 23
End: 2023-06-28
Payer: COMMERCIAL

## 2023-06-28 VITALS
TEMPERATURE: 97.6 F | RESPIRATION RATE: 14 BRPM | SYSTOLIC BLOOD PRESSURE: 112 MMHG | DIASTOLIC BLOOD PRESSURE: 76 MMHG | BODY MASS INDEX: 19.67 KG/M2 | HEART RATE: 114 BPM | WEIGHT: 141 LBS | OXYGEN SATURATION: 97 %

## 2023-06-28 DIAGNOSIS — A60.04 HERPES SIMPLEX VULVOVAGINITIS: ICD-10-CM

## 2023-06-28 PROCEDURE — 3074F SYST BP LT 130 MM HG: CPT | Performed by: STUDENT IN AN ORGANIZED HEALTH CARE EDUCATION/TRAINING PROGRAM

## 2023-06-28 PROCEDURE — 3078F DIAST BP <80 MM HG: CPT | Performed by: STUDENT IN AN ORGANIZED HEALTH CARE EDUCATION/TRAINING PROGRAM

## 2023-06-28 PROCEDURE — 99214 OFFICE O/P EST MOD 30 MIN: CPT | Performed by: STUDENT IN AN ORGANIZED HEALTH CARE EDUCATION/TRAINING PROGRAM

## 2023-06-28 RX ORDER — VALACYCLOVIR HYDROCHLORIDE 500 MG/1
500 TABLET, FILM COATED ORAL 2 TIMES DAILY
Qty: 6 TABLET | Refills: 1 | Status: SHIPPED | OUTPATIENT
Start: 2023-06-28 | End: 2023-07-04

## 2023-06-28 RX ORDER — LIDOCAINE 5 G/100G
1 CREAM RECTAL; TOPICAL 2 TIMES DAILY PRN
Qty: 45 G | Refills: 0 | Status: SHIPPED | OUTPATIENT
Start: 2023-06-28

## 2023-06-28 ASSESSMENT — ENCOUNTER SYMPTOMS
NAUSEA: 0
FLANK PAIN: 0
FEVER: 0
ABDOMINAL PAIN: 0
VOMITING: 0
DIARRHEA: 0
PALPITATIONS: 0
WHEEZING: 0
COUGH: 0
SHORTNESS OF BREATH: 0
CHILLS: 0
CONSTIPATION: 0

## 2023-06-28 ASSESSMENT — FIBROSIS 4 INDEX: FIB4 SCORE: 0.38

## 2023-06-28 NOTE — PROGRESS NOTES
Subjective     Virginia Mueller is a 22 y.o. female who presents with Other (Needs rx for HSV 2)            Virginia is a 22 y.o. female who presents to urgent care for for herpes outbreak.  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  Reports being diagnosed approximately 2 years ago [facility].  Patient has not had outbreaks since.  Patient states she developed blistering rash a few days ago which is gradually worsened since onset.        Review of Systems   Constitutional:  Negative for chills, fever and malaise/fatigue.   Respiratory:  Negative for cough, shortness of breath and wheezing.    Cardiovascular:  Negative for chest pain and palpitations.   Gastrointestinal:  Negative for abdominal pain, constipation, diarrhea, nausea and vomiting.   Genitourinary:  Negative for dysuria, flank pain, frequency, hematuria and urgency.   Skin:  Positive for rash.   All other systems reviewed and are negative.             Objective     /76   Pulse (!) 114   Temp 36.4 °C (97.6 °F) (Temporal)   Resp 14   Wt 64 kg (141 lb)   SpO2 97%   BMI 19.67 kg/m²      Physical Exam  Vitals reviewed.   Constitutional:       Appearance: Normal appearance.   HENT:      Head: Normocephalic and atraumatic.      Nose: Nose normal.      Mouth/Throat:      Mouth: Mucous membranes are moist.      Pharynx: Oropharynx is clear.   Eyes:      Extraocular Movements: Extraocular movements intact.      Conjunctiva/sclera: Conjunctivae normal.      Pupils: Pupils are equal, round, and reactive to light.   Cardiovascular:      Rate and Rhythm: Normal rate and regular rhythm.   Pulmonary:      Effort: Pulmonary effort is normal.      Breath sounds: Normal breath sounds.   Abdominal:      General: Abdomen is flat. Bowel sounds are normal.      Palpations: Abdomen is soft.   Genitourinary:     Comments: Declined  exam. Reports blistering rash which is painful.  Skin:     General: Skin is warm and dry.   Neurological:      General: No focal  deficit present.      Mental Status: She is alert. Mental status is at baseline.                             Assessment & Plan        1. Herpes simplex vulvovaginitis  - valACYclovir (VALTREX) 500 MG Tab; Take 1 Tablet by mouth 2 times a day for 6 days.  Dispense: 6 Tablet; Refill: 1  - Lidocaine, Anorectal, 5 % Cream; Apply 1 Application topically 2 times a day as needed (PAIN).  Dispense: 45 g; Refill: 0     Supportive care measures and indications for immediate follow-up discussed with patient. Pathogenesis of diagnosis discussed including typical length and natural progression.  See AVS. Advised to follow up with PCP.    My total time spent caring for the patient on the day of the encounter was 33 minutes including obtaining patient history, physical exam, discussing plan of care and supportive care measures, addressing patient's questions/concerns, discussing appropriate follow-up and indications for immediate follow-up. This does not include time spent on separately billable procedures/tests.     Instructed to return to urgent care or nearest emergency department if symptoms fail to improve, for any change in condition, further concerns, or new concerning symptoms.    Patient states understanding and agrees with the plan of care and discharge instructions.